# Patient Record
(demographics unavailable — no encounter records)

---

## 2024-12-06 NOTE — ADDENDUM
[FreeTextEntry1] : This note was written by Tez Macdonald on 12/06/2024 acting as medical scribe for Dr. Colt Dennison. I, Dr. Colt Dennison, have read and attest that all the information, medical decision making and discharge instructions within are true and accurate.

## 2024-12-06 NOTE — HISTORY OF PRESENT ILLNESS
[FreeTextEntry1] : Ms. MORRISON is a 77-year-old female who returns for endocrine reevaluation. She returns with regard to a history of osteoporosis.  Latest DEXA scan 2/23/24 Spine: -0.4 8.4% increase in bone mineral density as compared to prior study Left FN: -2.1 Left total Hip: -1.9 3.6% increase in bone mineral density as compared to prior study  FRAX Hip Fracture: 5.3% Major osteoporotic: 21%  Had fx rt wrist last november  no surgery needed  Was on Actonel 150 mg as generic risedronate for 7-8 yrs and stopped in August of 2019. She restarted Actonel Dec 2022 ad continues on this medication at his time. Tolerated well.  Recently tripped on a stone and fell. She suffered a fx to the right had; surgical intervention not required. Is currently going to PT with improvement. Also following with ortho. Diet high in calcium  ___________________________________________________________________________________ Additional medical history includes that of spine surgery for herniated disc and spinal stenosis. glaucoma Too has hx of hypertension on Telmisartan 80 mg, Hctz 12.5 mg 2-3x a week, and Metoprolol 25mg daily. Started rosuvastatin per her cardiologist about 5-6 weeks ago. Additional Medications: Vitamin B12 500 mcg once q 2 weeks and D3 2000 IU once a day  PMD Dr. Crews in [place]

## 2025-01-14 NOTE — PHYSICAL EXAM
[Chaperone Declined] : Patient declined chaperone [Appropriately responsive] : appropriately responsive [Alert] : alert [No Acute Distress] : no acute distress [Soft] : soft [Non-tender] : non-tender [Non-distended] : non-distended [No HSM] : No HSM [No Lesions] : no lesions [No Mass] : no mass [Oriented x3] : oriented x3 [Examination Of The Breasts] : a normal appearance [No Masses] : no breast masses were palpable [Labia Majora] : normal [Labia Minora] : normal [Normal] : normal [Uterine Adnexae] : normal [FreeTextEntry5] : stage 2 POP

## 2025-01-14 NOTE — HISTORY OF PRESENT ILLNESS
[FreeTextEntry1] : 78yo  here for wellness, last seen 2024 for vaginal itching. Hx atrophic vaginitis and started on vaginal estrogen at that time which pt continues to use prn with improvement. Hx of POP sp urogyn but not bothered by sx so chose expectant mgmt. Notes some increased white dc but no PMB.  On Actonel for osteoporosis, follows with endo  HCM - pap 2022 nilm - mammo 2024 birads 2 - BMD 2024 T -2.1  POB: FT  x2 GYN: prev used HRT after menopause for several years, denies any PMB, denies f/c/abnl paps

## 2025-01-14 NOTE — DISCUSSION/SUMMARY
[FreeTextEntry1] : Pap done, mammo UTD. POP noted on exam but pt largely asx, affirm collected for discharge. Refills given for vaginal estrogen.  Patient screened for depression - no signs of clinical depression. PHQ-9 scores reviewed over the course of the visit 5-10minutes of face to face time. Follow up with changes in mood including other symptoms of anxiety.

## 2025-06-04 NOTE — HISTORY OF PRESENT ILLNESS
[FreeTextEntry1] : Ms. MORRISON is a 77-year-old female who returns for endocrine reevaluation. She returns with regard to a history of osteoporosis.  Latest DEXA scan 2/23/24 Spine: -0.4 8.4% increase in bone mineral density as compared to prior study Left FN: -2.1 Left total Hip: -1.9 3.6% increase in bone mineral density as compared to prior study  FRAX Hip Fracture: 5.3% Major osteoporotic: 21%  Was on Actonel 150 mg as generic risedronate for 7-8 yrs and stopped in August of 2019. She restarted Actonel Dec 2022 and continues on this medication at his time. Tolerated well.  In Nov 2023, she tripped on a stone and fell. She suffered a fx to the right hand; surgical intervention not required and went to PT. Also following with ortho. Diet high in calcium. Takes vitamin D3 4000 IU daily.  Latest labs on 12/06/24 showed calcium at 10.1 and PTH at 60. ____________________________________________________________________________________________________ Ophthalmologic evaluation is not up to date. Hx of laser tx to lower pressure in eye.  Additional medical history includes that of spine surgery for herniated disc and spinal stenosis. glaucoma, hypertension - Has been experiencing intermittent palpitations but CT, monitor and endoscopy were normal. Pt is unsure if it is due to anxiety or GERD.  On Telmisartan 80 mg, Hctz 12.5 mg 2-3x a week, and Metoprolol 25mg daily. Started rosuvastatin per her cardiologist.  Additional Medications: Vitamin B12 500 mcg once q 2 weeks and D3 2000 IU once a day  PMD Dr. Crews in [place]

## 2025-06-04 NOTE — ADDENDUM
[FreeTextEntry1] : Blood will be drawn in office today.  This note was written by Zaida Cooper on 06/04/2025 acting as medical scribe for Dr. Colt Dennison. I, Dr. Colt Dennison, have read and attest that all the information, medical decision making and discharge instructions within are true and accurate.